# Patient Record
Sex: FEMALE | Race: WHITE | URBAN - METROPOLITAN AREA
[De-identification: names, ages, dates, MRNs, and addresses within clinical notes are randomized per-mention and may not be internally consistent; named-entity substitution may affect disease eponyms.]

---

## 2022-03-15 ENCOUNTER — OFFICE VISIT (OUTPATIENT)
Dept: ENT CLINIC | Age: 67
End: 2022-03-15
Payer: COMMERCIAL

## 2022-03-15 VITALS
DIASTOLIC BLOOD PRESSURE: 81 MMHG | HEIGHT: 63 IN | SYSTOLIC BLOOD PRESSURE: 128 MMHG | RESPIRATION RATE: 16 BRPM | WEIGHT: 140 LBS | HEART RATE: 82 BPM | TEMPERATURE: 97.7 F | BODY MASS INDEX: 24.8 KG/M2

## 2022-03-15 DIAGNOSIS — K21.9 GASTROESOPHAGEAL REFLUX DISEASE, UNSPECIFIED WHETHER ESOPHAGITIS PRESENT: ICD-10-CM

## 2022-03-15 DIAGNOSIS — H69.83 DYSFUNCTION OF BOTH EUSTACHIAN TUBES: Primary | ICD-10-CM

## 2022-03-15 DIAGNOSIS — R09.89 GLOBUS SENSATION: ICD-10-CM

## 2022-03-15 DIAGNOSIS — R05.9 COUGH: ICD-10-CM

## 2022-03-15 PROCEDURE — G8420 CALC BMI NORM PARAMETERS: HCPCS | Performed by: OTOLARYNGOLOGY

## 2022-03-15 PROCEDURE — G8484 FLU IMMUNIZE NO ADMIN: HCPCS | Performed by: OTOLARYNGOLOGY

## 2022-03-15 PROCEDURE — G8427 DOCREV CUR MEDS BY ELIG CLIN: HCPCS | Performed by: OTOLARYNGOLOGY

## 2022-03-15 PROCEDURE — 31575 DIAGNOSTIC LARYNGOSCOPY: CPT | Performed by: OTOLARYNGOLOGY

## 2022-03-15 PROCEDURE — 3017F COLORECTAL CA SCREEN DOC REV: CPT | Performed by: OTOLARYNGOLOGY

## 2022-03-15 PROCEDURE — 99204 OFFICE O/P NEW MOD 45 MIN: CPT | Performed by: OTOLARYNGOLOGY

## 2022-03-15 PROCEDURE — 1036F TOBACCO NON-USER: CPT | Performed by: OTOLARYNGOLOGY

## 2022-03-15 RX ORDER — FLUTICASONE PROPIONATE 50 MCG
2 SPRAY, SUSPENSION (ML) NASAL DAILY
Qty: 16 G | Refills: 2 | Status: SHIPPED | OUTPATIENT
Start: 2022-03-15

## 2022-03-15 RX ORDER — CHLORAL HYDRATE 500 MG
3000 CAPSULE ORAL 3 TIMES DAILY
COMMUNITY

## 2022-03-15 NOTE — PROGRESS NOTES
Mitchel Barraza 89, [address], [facility] Casi Wilkinson  P: [phone]       Patient     Gabriel Wells  [birth year]    ChiefComplaint     Chief Complaint   Patient presents with    Tinnitus     Bilateral ears- off and on- Pressure    Pharyngitis     States that she is \"dry\" has to have water and a cough drop- State sthat her throat is always on fire    Other     States that she feels like she has something stuck in her throat- States it feels like a \"bad cold\" but in her throat    Cough     States that she feels like she has to cough non-stop- [facility] to get tested for whooping cough    Hoarse     States that she loses her voice periodically and is always hoarse       History of Present Illness     Mellissa Escoto is a 61year old female here for evaluation of hoarseness and ear pressure. Symptoms present since COVID September 2021. Voice fluctuates throughout the day and with significant voice use will lose it at end of day. Admits burning sensation in throat. History of GERD-has made significant lifestyle changes. On PPI, unsure of dosing. Bilateral ear pressure and tinnitus. Denies dysphagia, odynophagia. Admits globus, dry cough. Past Medical History     Past Medical History:   Diagnosis Date    Tinnitus        Past Surgical History     Past Surgical History:   Procedure Laterality Date    BLADDER SURGERY      HERNIA REPAIR      TONSILLECTOMY         Family History     History reviewed. No pertinent family history. Social History     Social History     Tobacco Use    Smoking status: Never Smoker    Smokeless tobacco: Never Used   Vaping Use    Vaping Use: Never used   Substance Use Topics    Alcohol use:  Yes    Drug use: No        Allergies     No Known Allergies    Medications     Current Outpatient Medications   Medication Sig Dispense Refill    Coenzyme Q10 (CO Q 10 PO) Take by mouth      Ascorbic Acid (MAIKOL-C PO) Take by mouth      Omega-3 Fatty Acids (FISH OIL) 1000 MG CAPS Take 3,000 mg by mouth 3 times daily      fluticasone (FLONASE) 50 MCG/ACT nasal spray 2 sprays by Each Nostril route daily 16 g 2    SPIRONOLACTONE PO Take 25 mg by mouth       aspirin 81 MG tablet Take 81 mg by mouth daily      COREG 25 MG tablet TAKE ONE TABLET BY MOUTH TWICE A DAY WITH MEALS (Patient taking differently: 12.5 mg ) 60 tablet 6    losartan-hydrochlorothiazide (HYZAAR) 100-25 MG per tablet Take 1 tablet by mouth daily. 90 tablet 3    simvastatin (ZOCOR) 40 MG tablet Take 1 tablet by mouth nightly. Requires regular liver tests 90 tablet 3    Ubiquinol 200 MG CAPS Take 200 mg by mouth daily (Patient not taking: Reported on 3/15/2022)      estradiol (ESTRACE) 2 MG tablet Take 2 mg by mouth daily. (Patient not taking: Reported on 3/15/2022)       No current facility-administered medications for this visit. Review of Systems     Review of Systems   Constitutional: Negative for appetite change, chills, fatigue, fever and unexpected weight change. HENT: Positive for ear pain, tinnitus and voice change. Negative for congestion, ear discharge, facial swelling, hearing loss, nosebleeds, postnasal drip, sinus pressure, sneezing, sore throat and trouble swallowing. Eyes: Negative for itching. Respiratory: Negative for apnea, cough and shortness of breath. Endocrine: Negative for cold intolerance and heat intolerance. Musculoskeletal: Negative for myalgias and neck pain. Skin: Negative for rash. Allergic/Immunologic: Negative for environmental allergies. Neurological: Negative for dizziness and headaches. Psychiatric/Behavioral: Negative for confusion, decreased concentration and sleep disturbance.          PhysicalExam     Vitals:    03/15/22 1041   BP: 128/81   Site: Right Upper Arm   Position: Sitting   Cuff Size: Medium Adult   Pulse: 82   Resp: 16   Temp: 97.7 °F (36.5 °C)   TempSrc: Infrared   Weight: 140 lb (63.5 kg)   Height: 5' 3\" (1.6 m)       Physical Exam  Constitutional:       General: She is not in acute distress. Appearance: She is well-developed. HENT:      Head: Normocephalic and atraumatic. Right Ear: Tympanic membrane, ear canal and external ear normal. No drainage. No middle ear effusion. Tympanic membrane is not bulging. Tympanic membrane has normal mobility. Left Ear: Tympanic membrane, ear canal and external ear normal. No drainage. No middle ear effusion. Tympanic membrane is not bulging. Tympanic membrane has normal mobility. Nose: No mucosal edema or rhinorrhea. Mouth/Throat:      Lips: Pink. Mouth: Mucous membranes are moist.      Tongue: No lesions. Palate: No mass. Pharynx: Uvula midline. Eyes:      Pupils: Pupils are equal, round, and reactive to light. Neck:      Thyroid: No thyroid mass or thyromegaly. Trachea: Trachea and phonation normal.   Cardiovascular:      Pulses: Normal pulses. Pulmonary:      Effort: Pulmonary effort is normal. No accessory muscle usage or respiratory distress. Breath sounds: No stridor. Musculoskeletal:      Cervical back: Full passive range of motion without pain. Lymphadenopathy:      Head:      Right side of head: No submental or submandibular adenopathy. Left side of head: No submental or submandibular adenopathy. Cervical: No cervical adenopathy. Right cervical: No superficial, deep or posterior cervical adenopathy. Left cervical: No superficial, deep or posterior cervical adenopathy. Skin:     General: Skin is warm and dry. Neurological:      Mental Status: She is alert and oriented to person, place, and time. Cranial Nerves: No cranial nerve deficit. Coordination: Coordination normal.      Gait: Gait normal.   Psychiatric:         Thought Content:  Thought content normal.           Procedure     Flexible Laryngoscopy    Pre op: cough, hoarsness  Post op: GERD, cough  Procedure : Flexible Laryngoscopy  Surgeon: Félix Rhodes DO  Anesthesia: Afrin with 4% lidocaine  Indication: Laryngeal mirror examination was not tolerated due to gag reflex  Description:  The scope was passed along the floor of the right naris to the level of the larynx. The base of tongue, vallecula, epiglottis, aryepiglottic folds, arytenoids, false vocal folds, true vocal folds, or pyriform sinuses were all evaluated. True vocal folds exhibited symmetric motion bilaterally without evidence of paralysis or paresis. The scope was removed. The patient tolerated the procedure without difficulty. There were no complications. Pertinent findings: post cricoid thickening and erythema consistent with reflux related changes, no evidence of mass or lesion, edema bilateral TVC        Assessment and Plan     1. Dysfunction of both eustachian tubes  - fluticasone (FLONASE) 50 MCG/ACT nasal spray; 2 sprays by Each Nostril route daily  Dispense: 16 g; Refill: 2    2. Gastroesophageal reflux disease, unspecified whether esophagitis present  -evidence of ongoing reflux   -prescribed by cardiologist  -will call once she knows dose of PPI for further recommendations    3. Globus sensation  -2/2 above    4. Cough  -2/2 to GERD    Patient will call with dosing of current PPI, further recommendations at that time    Félix Rhodes DO  3/16/22      Portions of this note were dictated using Dragon.  There may be linguistic errors secondary to the use of this program.

## 2022-03-16 ENCOUNTER — TELEPHONE (OUTPATIENT)
Dept: ENT CLINIC | Age: 67
End: 2022-03-16

## 2022-03-16 DIAGNOSIS — K21.9 GASTROESOPHAGEAL REFLUX DISEASE, UNSPECIFIED WHETHER ESOPHAGITIS PRESENT: Primary | ICD-10-CM

## 2022-03-16 RX ORDER — PANTOPRAZOLE SODIUM 40 MG/1
40 TABLET, DELAYED RELEASE ORAL DAILY
COMMUNITY
Start: 2022-01-05 | End: 2022-03-22

## 2022-03-16 ASSESSMENT — ENCOUNTER SYMPTOMS
SINUS PRESSURE: 0
VOICE CHANGE: 1
FACIAL SWELLING: 0
TROUBLE SWALLOWING: 0
SHORTNESS OF BREATH: 0
EYE ITCHING: 0
COUGH: 0
SORE THROAT: 0
APNEA: 0

## 2022-03-16 NOTE — TELEPHONE ENCOUNTER
The patient called to let you know that she is taking Pantoprazole 40 mg. She stated that she did know the strength of the medication at her visit with you yesterday.  Please advise

## 2022-03-18 NOTE — TELEPHONE ENCOUNTER
Voicemail left for patient asking for return call. Please let patient know I recommend she take pantoprazole 40 mg twice a day and follow-up in 4 weeks.

## 2022-03-21 NOTE — TELEPHONE ENCOUNTER
I spoke with the patient. She will increase the pantoprazole 40 mg to twice a day. She is requesting that you send in a new prescription for her.  I scheduled her for a follow up in 4 weeks

## 2022-03-22 RX ORDER — PANTOPRAZOLE SODIUM 40 MG/1
40 TABLET, DELAYED RELEASE ORAL
Qty: 60 TABLET | Refills: 5 | Status: SHIPPED | OUTPATIENT
Start: 2022-03-22 | End: 2022-06-14 | Stop reason: SDUPTHER

## 2022-04-19 ENCOUNTER — OFFICE VISIT (OUTPATIENT)
Dept: ENT CLINIC | Age: 67
End: 2022-04-19
Payer: COMMERCIAL

## 2022-04-19 VITALS — HEIGHT: 63 IN | BODY MASS INDEX: 24.8 KG/M2 | WEIGHT: 140 LBS | TEMPERATURE: 97.4 F | RESPIRATION RATE: 16 BRPM

## 2022-04-19 DIAGNOSIS — J30.9 ALLERGIC RHINITIS, UNSPECIFIED SEASONALITY, UNSPECIFIED TRIGGER: ICD-10-CM

## 2022-04-19 DIAGNOSIS — R05.9 COUGH: ICD-10-CM

## 2022-04-19 DIAGNOSIS — K21.9 GASTROESOPHAGEAL REFLUX DISEASE, UNSPECIFIED WHETHER ESOPHAGITIS PRESENT: Primary | ICD-10-CM

## 2022-04-19 PROCEDURE — G8427 DOCREV CUR MEDS BY ELIG CLIN: HCPCS | Performed by: OTOLARYNGOLOGY

## 2022-04-19 PROCEDURE — 1036F TOBACCO NON-USER: CPT | Performed by: OTOLARYNGOLOGY

## 2022-04-19 PROCEDURE — G8420 CALC BMI NORM PARAMETERS: HCPCS | Performed by: OTOLARYNGOLOGY

## 2022-04-19 PROCEDURE — 3017F COLORECTAL CA SCREEN DOC REV: CPT | Performed by: OTOLARYNGOLOGY

## 2022-04-19 PROCEDURE — 99214 OFFICE O/P EST MOD 30 MIN: CPT | Performed by: OTOLARYNGOLOGY

## 2022-04-19 RX ORDER — CETIRIZINE HYDROCHLORIDE 10 MG/1
10 TABLET ORAL DAILY
Qty: 30 TABLET | Refills: 0 | Status: SHIPPED | OUTPATIENT
Start: 2022-04-19 | End: 2022-05-19

## 2022-04-19 RX ORDER — AZELASTINE 1 MG/ML
1 SPRAY, METERED NASAL 2 TIMES DAILY
Qty: 60 ML | Refills: 11 | Status: SHIPPED | OUTPATIENT
Start: 2022-04-19

## 2022-04-19 ASSESSMENT — ENCOUNTER SYMPTOMS
TROUBLE SWALLOWING: 0
APNEA: 0
EYE ITCHING: 0
SINUS PRESSURE: 0
VOICE CHANGE: 1
FACIAL SWELLING: 0
SHORTNESS OF BREATH: 0
SORE THROAT: 0
COUGH: 1

## 2022-04-19 NOTE — PROGRESS NOTES
Mitchel Barraza 89, [address], [facility] Casi Wilkinson  P: [phone]       Patient     Gisel Juárez  [birth year]    ChiefComplaint     Chief Complaint   Patient presents with    Follow-up     Here today to follow up on GERD, states that she is doing much better, BUT- States that she still has a tickle in her throat and trouble swallowing. States that she has to have a cough drop or water all the time. States that it might be allergies. Has to clear throat all the time, states that her eyes are watery too. Allergy testing? Cough. Constant irritation. History of Present Illness     Stanislaw Olvera is a 53-year-old female here today for follow-up regarding chronic sore throat, cough change in voice. Since last seen by me has been taking pantoprazole twice daily with resolution of burning pain in throat. Ongoing intermittent voice change with dry cough. Admits nasal congestion and postnasal drainage despite use of Flonase. Past Medical History     Past Medical History:   Diagnosis Date    Tinnitus        Past Surgical History     Past Surgical History:   Procedure Laterality Date    BLADDER SURGERY      HERNIA REPAIR      TONSILLECTOMY         Family History     History reviewed. No pertinent family history. Social History     Social History     Tobacco Use    Smoking status: Never Smoker    Smokeless tobacco: Never Used   Vaping Use    Vaping Use: Never used   Substance Use Topics    Alcohol use:  Yes    Drug use: No        Allergies     No Known Allergies    Medications     Current Outpatient Medications   Medication Sig Dispense Refill    azelastine (ASTELIN) 0.1 % nasal spray 1 spray by Nasal route 2 times daily Use in each nostril as directed 60 mL 11    cetirizine (ZYRTEC) 10 MG tablet Take 1 tablet by mouth daily 30 tablet 0    pantoprazole (PROTONIX) 40 MG tablet Take 1 tablet by mouth 2 times daily (before meals) 60 tablet 5    Coenzyme Q10 (CO Q 10 PO) Take by mouth  Ascorbic Acid (MAIKOL-C PO) Take by mouth      Omega-3 Fatty Acids (FISH OIL) 1000 MG CAPS Take 3,000 mg by mouth 3 times daily      fluticasone (FLONASE) 50 MCG/ACT nasal spray 2 sprays by Each Nostril route daily 16 g 2    SPIRONOLACTONE PO Take 25 mg by mouth       aspirin 81 MG tablet Take 81 mg by mouth daily      COREG 25 MG tablet TAKE ONE TABLET BY MOUTH TWICE A DAY WITH MEALS (Patient taking differently: 12.5 mg ) 60 tablet 6    losartan-hydrochlorothiazide (HYZAAR) 100-25 MG per tablet Take 1 tablet by mouth daily. 90 tablet 3    simvastatin (ZOCOR) 40 MG tablet Take 1 tablet by mouth nightly. Requires regular liver tests 90 tablet 3    Ubiquinol 200 MG CAPS Take 200 mg by mouth daily (Patient not taking: Reported on 3/15/2022)      estradiol (ESTRACE) 2 MG tablet Take 2 mg by mouth daily. (Patient not taking: Reported on 3/15/2022)       No current facility-administered medications for this visit. Review of Systems     Review of Systems   Constitutional: Negative for appetite change, chills, fatigue, fever and unexpected weight change. HENT: Positive for congestion, postnasal drip and voice change. Negative for ear discharge, ear pain, facial swelling, hearing loss, nosebleeds, sinus pressure, sneezing, sore throat, tinnitus and trouble swallowing. Eyes: Negative for itching. Respiratory: Positive for cough. Negative for apnea and shortness of breath. Endocrine: Negative for cold intolerance and heat intolerance. Musculoskeletal: Negative for myalgias and neck pain. Skin: Negative for rash. Allergic/Immunologic: Negative for environmental allergies. Neurological: Negative for dizziness and headaches. Psychiatric/Behavioral: Negative for confusion, decreased concentration and sleep disturbance.          PhysicalExam     Vitals:    04/19/22 0851   Resp: 16   Temp: 97.4 °F (36.3 °C)   TempSrc: Infrared   Weight: 140 lb (63.5 kg)   Height: 5' 3\" (1.6 m)       Physical Exam  Constitutional:       General: She is not in acute distress. Appearance: She is well-developed. HENT:      Head: Normocephalic and atraumatic. Right Ear: Tympanic membrane, ear canal and external ear normal. No drainage. No middle ear effusion. Tympanic membrane is not bulging. Tympanic membrane has normal mobility. Left Ear: Tympanic membrane, ear canal and external ear normal. No drainage. No middle ear effusion. Tympanic membrane is not bulging. Tympanic membrane has normal mobility. Nose: No mucosal edema or rhinorrhea. Right Turbinates: Enlarged. Left Turbinates: Enlarged. Mouth/Throat:      Lips: Pink. Mouth: Mucous membranes are moist.      Tongue: No lesions. Palate: No mass. Pharynx: Uvula midline. Eyes:      Pupils: Pupils are equal, round, and reactive to light. Neck:      Thyroid: No thyroid mass or thyromegaly. Trachea: Trachea and phonation normal.   Cardiovascular:      Pulses: Normal pulses. Pulmonary:      Effort: Pulmonary effort is normal. No accessory muscle usage or respiratory distress. Breath sounds: No stridor. Musculoskeletal:      Cervical back: Full passive range of motion without pain. Lymphadenopathy:      Head:      Right side of head: No submental or submandibular adenopathy. Left side of head: No submental or submandibular adenopathy. Cervical: No cervical adenopathy. Right cervical: No superficial, deep or posterior cervical adenopathy. Left cervical: No superficial, deep or posterior cervical adenopathy. Skin:     General: Skin is warm and dry. Neurological:      Mental Status: She is alert and oriented to person, place, and time. Cranial Nerves: No cranial nerve deficit. Coordination: Coordination normal.      Gait: Gait normal.   Psychiatric:         Thought Content: Thought content normal.           Assessment and Plan     1.  Gastroesophageal reflux disease, unspecified whether esophagitis present  -Improved with pantoprazole twice daily  -Continue    2. Cough    3. Allergic rhinitis, unspecified seasonality, unspecified trigger  -Continue Flonase, start Astelin  -Start antihistamine      Follow-up in 1 month    Araceli Moody DO  4/19/22      Portions of this note were dictated using Dragon.  There may be linguistic errors secondary to the use of this program.

## 2022-04-19 NOTE — PATIENT INSTRUCTIONS
Zyrtec (ceterizine)  Continue flonase 1 spray each side twice a day  Astelin 1 spray each side twice a day

## 2022-05-20 DIAGNOSIS — J30.9 ALLERGIC RHINITIS, UNSPECIFIED SEASONALITY, UNSPECIFIED TRIGGER: ICD-10-CM

## 2022-05-20 RX ORDER — CETIRIZINE HYDROCHLORIDE 10 MG/1
TABLET ORAL
Qty: 30 TABLET | Refills: 0 | OUTPATIENT
Start: 2022-05-20

## 2022-06-14 ENCOUNTER — OFFICE VISIT (OUTPATIENT)
Dept: ENT CLINIC | Age: 67
End: 2022-06-14
Payer: COMMERCIAL

## 2022-06-14 VITALS — TEMPERATURE: 98 F | WEIGHT: 140 LBS | HEIGHT: 63 IN | RESPIRATION RATE: 16 BRPM | BODY MASS INDEX: 24.8 KG/M2

## 2022-06-14 DIAGNOSIS — K21.9 GASTROESOPHAGEAL REFLUX DISEASE, UNSPECIFIED WHETHER ESOPHAGITIS PRESENT: ICD-10-CM

## 2022-06-14 DIAGNOSIS — J30.0 VASOMOTOR RHINITIS: Primary | ICD-10-CM

## 2022-06-14 DIAGNOSIS — R05.9 COUGH: ICD-10-CM

## 2022-06-14 PROCEDURE — 99214 OFFICE O/P EST MOD 30 MIN: CPT | Performed by: OTOLARYNGOLOGY

## 2022-06-14 PROCEDURE — 3017F COLORECTAL CA SCREEN DOC REV: CPT | Performed by: OTOLARYNGOLOGY

## 2022-06-14 PROCEDURE — G8420 CALC BMI NORM PARAMETERS: HCPCS | Performed by: OTOLARYNGOLOGY

## 2022-06-14 PROCEDURE — 1036F TOBACCO NON-USER: CPT | Performed by: OTOLARYNGOLOGY

## 2022-06-14 PROCEDURE — G8427 DOCREV CUR MEDS BY ELIG CLIN: HCPCS | Performed by: OTOLARYNGOLOGY

## 2022-06-14 RX ORDER — PANTOPRAZOLE SODIUM 40 MG/1
40 TABLET, DELAYED RELEASE ORAL DAILY
Qty: 60 TABLET | Refills: 5 | Status: SHIPPED | OUTPATIENT
Start: 2022-06-14

## 2022-06-14 RX ORDER — IPRATROPIUM BROMIDE 21 UG/1
2 SPRAY, METERED NASAL 4 TIMES DAILY
Qty: 1 EACH | Refills: 1 | Status: SHIPPED | OUTPATIENT
Start: 2022-06-14

## 2022-06-14 ASSESSMENT — ENCOUNTER SYMPTOMS
COUGH: 0
APNEA: 0
VOICE CHANGE: 0
SINUS PRESSURE: 0
SORE THROAT: 0
RHINORRHEA: 1
EYE ITCHING: 0
SHORTNESS OF BREATH: 0
TROUBLE SWALLOWING: 0
FACIAL SWELLING: 0

## 2022-06-14 NOTE — PROGRESS NOTES
Mitchel Barraza 89, [address], [address] [place] [address]  P: [phone]       Patient     Avelina Caller  [birth year]    ChiefComplaint     Chief Complaint   Patient presents with    Follow-up     Patient states that she is feeling way better    Sinus Problem     States that her nose is still bothering her, runny and congested       History of Present Illness   Jose Roberto Majano is a 61year old female here today for follow up regarding cough, GERD and rhinitis. Reports continued improvement in cough and voice. Persistent nasal congestion and drainage despite flonase and astelin. Past Medical History     Past Medical History:   Diagnosis Date    Tinnitus        Past Surgical History     Past Surgical History:   Procedure Laterality Date    BLADDER SURGERY      HERNIA REPAIR      TONSILLECTOMY         Family History     History reviewed. No pertinent family history.     Social History     Social History     Tobacco Use    Smoking status: Never Smoker    Smokeless tobacco: Never Used   Vaping Use    Vaping Use: Never used   Substance Use Topics    Alcohol use: Yes     Comment: socially    Drug use: No        Allergies     No Known Allergies    Medications     Current Outpatient Medications   Medication Sig Dispense Refill    ipratropium (ATROVENT) 0.03 % nasal spray 2 sprays by Each Nostril route 4 times daily 1 each 1    pantoprazole (PROTONIX) 40 MG tablet Take 1 tablet by mouth daily 60 tablet 5    azelastine (ASTELIN) 0.1 % nasal spray 1 spray by Nasal route 2 times daily Use in each nostril as directed 60 mL 11    Coenzyme Q10 (CO Q 10 PO) Take by mouth      Ascorbic Acid (MAIKOL-C PO) Take by mouth      Omega-3 Fatty Acids (FISH OIL) 1000 MG CAPS Take 3,000 mg by mouth 3 times daily      fluticasone (FLONASE) 50 MCG/ACT nasal spray 2 sprays by Each Nostril route daily 16 g 2    SPIRONOLACTONE PO Take 25 mg by mouth       aspirin 81 MG tablet Take 81 mg by mouth daily      COREG 25 MG tablet TAKE ONE TABLET BY MOUTH TWICE A DAY WITH MEALS (Patient taking differently: 12.5 mg ) 60 tablet 6    losartan-hydrochlorothiazide (HYZAAR) 100-25 MG per tablet Take 1 tablet by mouth daily. 90 tablet 3    simvastatin (ZOCOR) 40 MG tablet Take 1 tablet by mouth nightly. Requires regular liver tests 90 tablet 3    Ubiquinol 200 MG CAPS Take 200 mg by mouth daily (Patient not taking: Reported on 3/15/2022)      estradiol (ESTRACE) 2 MG tablet Take 2 mg by mouth daily  (Patient not taking: Reported on 6/14/2022)       No current facility-administered medications for this visit. Review of Systems     Review of Systems   Constitutional: Negative for appetite change, chills, fatigue, fever and unexpected weight change. HENT: Positive for congestion and rhinorrhea. Negative for ear discharge, ear pain, facial swelling, hearing loss, nosebleeds, postnasal drip, sinus pressure, sneezing, sore throat, tinnitus, trouble swallowing and voice change. Eyes: Negative for itching. Respiratory: Negative for apnea, cough and shortness of breath. Endocrine: Negative for cold intolerance and heat intolerance. Musculoskeletal: Negative for myalgias and neck pain. Skin: Negative for rash. Allergic/Immunologic: Negative for environmental allergies. Neurological: Negative for dizziness and headaches. Psychiatric/Behavioral: Negative for confusion, decreased concentration and sleep disturbance. PhysicalExam     Vitals:    06/14/22 0927   Resp: 16   Temp: 98 °F (36.7 °C)   TempSrc: Infrared   Weight: 140 lb (63.5 kg)   Height: 5' 3\" (1.6 m)       Physical Exam  Constitutional:       General: She is not in acute distress. Appearance: She is well-developed. HENT:      Head: Normocephalic and atraumatic. Right Ear: Tympanic membrane, ear canal and external ear normal. No drainage. No middle ear effusion. Tympanic membrane is not bulging. Tympanic membrane has normal mobility.       Left Ear: Tympanic membrane, ear canal and external ear normal. No drainage. No middle ear effusion. Tympanic membrane is not bulging. Tympanic membrane has normal mobility. Nose: No mucosal edema or rhinorrhea. Right Turbinates: Pale. Left Turbinates: Pale. Mouth/Throat:      Lips: Pink. Mouth: Mucous membranes are moist.      Tongue: No lesions. Palate: No mass. Pharynx: Uvula midline. Eyes:      Pupils: Pupils are equal, round, and reactive to light. Neck:      Thyroid: No thyroid mass or thyromegaly. Trachea: Trachea and phonation normal.   Cardiovascular:      Pulses: Normal pulses. Pulmonary:      Effort: Pulmonary effort is normal. No accessory muscle usage or respiratory distress. Breath sounds: No stridor. Musculoskeletal:      Cervical back: Full passive range of motion without pain. Lymphadenopathy:      Head:      Right side of head: No submental or submandibular adenopathy. Left side of head: No submental or submandibular adenopathy. Cervical: No cervical adenopathy. Right cervical: No superficial, deep or posterior cervical adenopathy. Left cervical: No superficial, deep or posterior cervical adenopathy. Skin:     General: Skin is warm and dry. Neurological:      Mental Status: She is alert and oriented to person, place, and time. Cranial Nerves: No cranial nerve deficit. Coordination: Coordination normal.      Gait: Gait normal.   Psychiatric:         Thought Content: Thought content normal.             Assessment and Plan     1. Vasomotor rhinitis  -no response to flonase or astelin- possible vasomotor  - ipratropium (ATROVENT) 0.03 % nasal spray; 2 sprays by Each Nostril route 4 times daily  Dispense: 1 each; Refill: 1    2. Gastroesophageal reflux disease, unspecified whether esophagitis present  -symptoms well controlled, decrease to daily dosing  - pantoprazole (PROTONIX) 40 MG tablet;  Take 1 tablet by mouth daily Dispense: 60 tablet; Refill: 5    3. Cough  -resolved      Follow up in 3 months- will message sooner if she needs the high dose atrovent    Eduin Milton DO  6/14/22      Portions of this note were dictated using Dragon.  There may be linguistic errors secondary to the use of this program.